# Patient Record
Sex: FEMALE | Race: WHITE | NOT HISPANIC OR LATINO | URBAN - METROPOLITAN AREA
[De-identification: names, ages, dates, MRNs, and addresses within clinical notes are randomized per-mention and may not be internally consistent; named-entity substitution may affect disease eponyms.]

---

## 2023-07-14 LAB
C TRACH DNA SPEC QL NAA+PROBE: NEGATIVE
HBV SURFACE AG SERPL QL IA: NEGATIVE
HCV AB SERPL QL IA: NEGATIVE
HIV 1+2 AB+HIV1 P24 AG SERPL QL IA: NEGATIVE
N GONORRHOEA DNA SPEC QL NAA+PROBE: NEGATIVE
RUBELLA IMMUNE STATUS: NORMAL

## 2024-01-04 LAB — PRENATAL STREP B CULTURE: POSITIVE

## 2024-01-23 ENCOUNTER — HOSPITAL ENCOUNTER (INPATIENT)
Facility: HOSPITAL | Age: 24
LOS: 2 days | Discharge: HOME | End: 2024-01-25
Attending: OBSTETRICS & GYNECOLOGY | Admitting: OBSTETRICS & GYNECOLOGY
Payer: MEDICAID

## 2024-01-23 DIAGNOSIS — Z34.90 ENCOUNTER FOR ELECTIVE INDUCTION OF LABOR: ICD-10-CM

## 2024-01-23 DIAGNOSIS — Z34.90 PREGNANCY: ICD-10-CM

## 2024-01-23 LAB
BASOPHILS # BLD AUTO: 0.02 X10(3)/MCL
BASOPHILS NFR BLD AUTO: 0.2 %
EOSINOPHIL # BLD AUTO: 0.03 X10(3)/MCL (ref 0–0.9)
EOSINOPHIL NFR BLD AUTO: 0.3 %
ERYTHROCYTE [DISTWIDTH] IN BLOOD BY AUTOMATED COUNT: 14.6 % (ref 11.5–17)
GROUP & RH: NORMAL
HCT VFR BLD AUTO: 35.4 % (ref 37–47)
HGB BLD-MCNC: 10.9 G/DL (ref 12–16)
IMM GRANULOCYTES # BLD AUTO: 0.03 X10(3)/MCL (ref 0–0.04)
IMM GRANULOCYTES NFR BLD AUTO: 0.3 %
INDIRECT COOMBS: NORMAL
LYMPHOCYTES # BLD AUTO: 2.19 X10(3)/MCL (ref 0.6–4.6)
LYMPHOCYTES NFR BLD AUTO: 23.8 %
MCH RBC QN AUTO: 25.3 PG (ref 27–31)
MCHC RBC AUTO-ENTMCNC: 30.8 G/DL (ref 33–36)
MCV RBC AUTO: 82.1 FL (ref 80–94)
MONOCYTES # BLD AUTO: 0.56 X10(3)/MCL (ref 0.1–1.3)
MONOCYTES NFR BLD AUTO: 6.1 %
NEUTROPHILS # BLD AUTO: 6.37 X10(3)/MCL (ref 2.1–9.2)
NEUTROPHILS NFR BLD AUTO: 69.3 %
NRBC BLD AUTO-RTO: 0 %
PLATELET # BLD AUTO: 166 X10(3)/MCL (ref 130–400)
PMV BLD AUTO: 12 FL (ref 7.4–10.4)
RBC # BLD AUTO: 4.31 X10(6)/MCL (ref 4.2–5.4)
SPECIMEN OUTDATE: NORMAL
T PALLIDUM AB SER QL: NONREACTIVE
WBC # SPEC AUTO: 9.2 X10(3)/MCL (ref 4.5–11.5)

## 2024-01-23 PROCEDURE — 25000003 PHARM REV CODE 250: Performed by: OBSTETRICS & GYNECOLOGY

## 2024-01-23 PROCEDURE — 63600175 PHARM REV CODE 636 W HCPCS: Performed by: OBSTETRICS & GYNECOLOGY

## 2024-01-23 PROCEDURE — 11000001 HC ACUTE MED/SURG PRIVATE ROOM

## 2024-01-23 PROCEDURE — 86901 BLOOD TYPING SEROLOGIC RH(D): CPT | Performed by: OBSTETRICS & GYNECOLOGY

## 2024-01-23 PROCEDURE — 86780 TREPONEMA PALLIDUM: CPT | Performed by: OBSTETRICS & GYNECOLOGY

## 2024-01-23 PROCEDURE — 85025 COMPLETE CBC W/AUTO DIFF WBC: CPT | Performed by: OBSTETRICS & GYNECOLOGY

## 2024-01-23 RX ORDER — OXYTOCIN/RINGER'S LACTATE 30/500 ML
334 PLASTIC BAG, INJECTION (ML) INTRAVENOUS ONCE
Status: DISCONTINUED | OUTPATIENT
Start: 2024-01-23 | End: 2024-01-24

## 2024-01-23 RX ORDER — OXYTOCIN/RINGER'S LACTATE 30/500 ML
95 PLASTIC BAG, INJECTION (ML) INTRAVENOUS ONCE AS NEEDED
Status: DISCONTINUED | OUTPATIENT
Start: 2024-01-23 | End: 2024-01-24

## 2024-01-23 RX ORDER — PENICILLIN G 3000000 [IU]/50ML
3 INJECTION, SOLUTION INTRAVENOUS
Status: DISCONTINUED | OUTPATIENT
Start: 2024-01-24 | End: 2024-01-24

## 2024-01-23 RX ORDER — CALCIUM CARBONATE 200(500)MG
500 TABLET,CHEWABLE ORAL 3 TIMES DAILY PRN
Status: DISCONTINUED | OUTPATIENT
Start: 2024-01-23 | End: 2024-01-24

## 2024-01-23 RX ORDER — OXYTOCIN 10 [USP'U]/ML
10 INJECTION, SOLUTION INTRAMUSCULAR; INTRAVENOUS ONCE AS NEEDED
Status: DISCONTINUED | OUTPATIENT
Start: 2024-01-23 | End: 2024-01-24

## 2024-01-23 RX ORDER — MISOPROSTOL 100 UG/1
800 TABLET ORAL ONCE AS NEEDED
Status: DISCONTINUED | OUTPATIENT
Start: 2024-01-23 | End: 2024-01-24

## 2024-01-23 RX ORDER — PROCHLORPERAZINE EDISYLATE 5 MG/ML
5 INJECTION INTRAMUSCULAR; INTRAVENOUS EVERY 6 HOURS PRN
Status: DISCONTINUED | OUTPATIENT
Start: 2024-01-23 | End: 2024-01-25 | Stop reason: HOSPADM

## 2024-01-23 RX ORDER — OXYTOCIN/RINGER'S LACTATE 30/500 ML
334 PLASTIC BAG, INJECTION (ML) INTRAVENOUS ONCE AS NEEDED
Status: DISCONTINUED | OUTPATIENT
Start: 2024-01-23 | End: 2024-01-24

## 2024-01-23 RX ORDER — ONDANSETRON 4 MG/1
8 TABLET, ORALLY DISINTEGRATING ORAL EVERY 8 HOURS PRN
Status: DISCONTINUED | OUTPATIENT
Start: 2024-01-23 | End: 2024-01-25 | Stop reason: HOSPADM

## 2024-01-23 RX ORDER — SIMETHICONE 80 MG
1 TABLET,CHEWABLE ORAL 4 TIMES DAILY PRN
Status: DISCONTINUED | OUTPATIENT
Start: 2024-01-23 | End: 2024-01-24

## 2024-01-23 RX ORDER — SODIUM CHLORIDE, SODIUM LACTATE, POTASSIUM CHLORIDE, CALCIUM CHLORIDE 600; 310; 30; 20 MG/100ML; MG/100ML; MG/100ML; MG/100ML
INJECTION, SOLUTION INTRAVENOUS CONTINUOUS
Status: DISCONTINUED | OUTPATIENT
Start: 2024-01-23 | End: 2024-01-24

## 2024-01-23 RX ORDER — OXYTOCIN/RINGER'S LACTATE 30/500 ML
0-30 PLASTIC BAG, INJECTION (ML) INTRAVENOUS CONTINUOUS
Status: DISCONTINUED | OUTPATIENT
Start: 2024-01-24 | End: 2024-01-24

## 2024-01-23 RX ORDER — DIPHENOXYLATE HYDROCHLORIDE AND ATROPINE SULFATE 2.5; .025 MG/1; MG/1
2 TABLET ORAL EVERY 6 HOURS PRN
Status: DISCONTINUED | OUTPATIENT
Start: 2024-01-23 | End: 2024-01-24

## 2024-01-23 RX ORDER — METHYLERGONOVINE MALEATE 0.2 MG/ML
200 INJECTION INTRAVENOUS ONCE AS NEEDED
Status: DISCONTINUED | OUTPATIENT
Start: 2024-01-23 | End: 2024-01-24

## 2024-01-23 RX ORDER — OXYTOCIN/RINGER'S LACTATE 30/500 ML
95 PLASTIC BAG, INJECTION (ML) INTRAVENOUS ONCE
Status: DISCONTINUED | OUTPATIENT
Start: 2024-01-23 | End: 2024-01-24

## 2024-01-23 RX ORDER — LIDOCAINE HYDROCHLORIDE 10 MG/ML
10 INJECTION INFILTRATION; PERINEURAL ONCE AS NEEDED
Status: DISCONTINUED | OUTPATIENT
Start: 2024-01-23 | End: 2024-01-24

## 2024-01-23 RX ORDER — CARBOPROST TROMETHAMINE 250 UG/ML
250 INJECTION, SOLUTION INTRAMUSCULAR
Status: DISCONTINUED | OUTPATIENT
Start: 2024-01-23 | End: 2024-01-24

## 2024-01-23 RX ADMIN — SODIUM CHLORIDE 5 MILLION UNITS: 900 INJECTION INTRAVENOUS at 09:01

## 2024-01-24 ENCOUNTER — ANESTHESIA (OUTPATIENT)
Dept: OBSTETRICS AND GYNECOLOGY | Facility: HOSPITAL | Age: 24
End: 2024-01-24
Payer: MEDICAID

## 2024-01-24 ENCOUNTER — ANESTHESIA EVENT (OUTPATIENT)
Dept: OBSTETRICS AND GYNECOLOGY | Facility: HOSPITAL | Age: 24
End: 2024-01-24
Payer: MEDICAID

## 2024-01-24 PROBLEM — Z34.90 ENCOUNTER FOR ELECTIVE INDUCTION OF LABOR: Status: ACTIVE | Noted: 2024-01-24

## 2024-01-24 PROCEDURE — 63600175 PHARM REV CODE 636 W HCPCS: Mod: JZ,JG | Performed by: NURSE ANESTHETIST, CERTIFIED REGISTERED

## 2024-01-24 PROCEDURE — 11000001 HC ACUTE MED/SURG PRIVATE ROOM

## 2024-01-24 PROCEDURE — 25000003 PHARM REV CODE 250: Performed by: ANESTHESIOLOGY

## 2024-01-24 PROCEDURE — 25000003 PHARM REV CODE 250: Performed by: NURSE ANESTHETIST, CERTIFIED REGISTERED

## 2024-01-24 PROCEDURE — 25000003 PHARM REV CODE 250: Performed by: OBSTETRICS & GYNECOLOGY

## 2024-01-24 PROCEDURE — 72200005 HC VAGINAL DELIVERY LEVEL II

## 2024-01-24 PROCEDURE — 63600175 PHARM REV CODE 636 W HCPCS: Performed by: OBSTETRICS & GYNECOLOGY

## 2024-01-24 PROCEDURE — 10907ZC DRAINAGE OF AMNIOTIC FLUID, THERAPEUTIC FROM PRODUCTS OF CONCEPTION, VIA NATURAL OR ARTIFICIAL OPENING: ICD-10-PCS | Performed by: OBSTETRICS & GYNECOLOGY

## 2024-01-24 PROCEDURE — 94761 N-INVAS EAR/PLS OXIMETRY MLT: CPT

## 2024-01-24 PROCEDURE — 72100002 HC LABOR CARE, 1ST 8 HOURS

## 2024-01-24 PROCEDURE — 59409 OBSTETRICAL CARE: CPT | Mod: QZ,,, | Performed by: NURSE ANESTHETIST, CERTIFIED REGISTERED

## 2024-01-24 PROCEDURE — 62326 NJX INTERLAMINAR LMBR/SAC: CPT | Performed by: NURSE ANESTHETIST, CERTIFIED REGISTERED

## 2024-01-24 PROCEDURE — 72100003 HC LABOR CARE, EA. ADDL. 8 HRS

## 2024-01-24 PROCEDURE — 51702 INSERT TEMP BLADDER CATH: CPT

## 2024-01-24 PROCEDURE — 3E033VJ INTRODUCTION OF OTHER HORMONE INTO PERIPHERAL VEIN, PERCUTANEOUS APPROACH: ICD-10-PCS | Performed by: OBSTETRICS & GYNECOLOGY

## 2024-01-24 RX ORDER — EPHEDRINE SULFATE 50 MG/ML
10 INJECTION, SOLUTION INTRAVENOUS ONCE
Status: COMPLETED | OUTPATIENT
Start: 2024-01-24 | End: 2024-01-24

## 2024-01-24 RX ORDER — DIPHENHYDRAMINE HCL 25 MG
25 CAPSULE ORAL EVERY 4 HOURS PRN
Status: DISCONTINUED | OUTPATIENT
Start: 2024-01-24 | End: 2024-01-25 | Stop reason: HOSPADM

## 2024-01-24 RX ORDER — OXYCODONE AND ACETAMINOPHEN 10; 325 MG/1; MG/1
1 TABLET ORAL EVERY 6 HOURS PRN
Status: DISCONTINUED | OUTPATIENT
Start: 2024-01-24 | End: 2024-01-25 | Stop reason: HOSPADM

## 2024-01-24 RX ORDER — OXYCODONE AND ACETAMINOPHEN 5; 325 MG/1; MG/1
1 TABLET ORAL EVERY 6 HOURS PRN
Status: DISCONTINUED | OUTPATIENT
Start: 2024-01-24 | End: 2024-01-25 | Stop reason: HOSPADM

## 2024-01-24 RX ORDER — FAMOTIDINE 10 MG/ML
20 INJECTION INTRAVENOUS ONCE
Status: DISCONTINUED | OUTPATIENT
Start: 2024-01-24 | End: 2024-01-25 | Stop reason: HOSPADM

## 2024-01-24 RX ORDER — BUPIVACAINE HYDROCHLORIDE 2.5 MG/ML
INJECTION, SOLUTION EPIDURAL; INFILTRATION; INTRACAUDAL
Status: DISCONTINUED | OUTPATIENT
Start: 2024-01-24 | End: 2024-01-24

## 2024-01-24 RX ORDER — OXYTOCIN 10 [USP'U]/ML
10 INJECTION, SOLUTION INTRAMUSCULAR; INTRAVENOUS ONCE AS NEEDED
Status: DISCONTINUED | OUTPATIENT
Start: 2024-01-24 | End: 2024-01-25 | Stop reason: HOSPADM

## 2024-01-24 RX ORDER — DOCUSATE SODIUM 100 MG/1
200 CAPSULE, LIQUID FILLED ORAL 2 TIMES DAILY PRN
Status: DISCONTINUED | OUTPATIENT
Start: 2024-01-24 | End: 2024-01-25 | Stop reason: HOSPADM

## 2024-01-24 RX ORDER — OXYTOCIN/RINGER'S LACTATE 30/500 ML
95 PLASTIC BAG, INJECTION (ML) INTRAVENOUS ONCE AS NEEDED
Status: DISCONTINUED | OUTPATIENT
Start: 2024-01-24 | End: 2024-01-25 | Stop reason: HOSPADM

## 2024-01-24 RX ORDER — EPHEDRINE SULFATE 50 MG/ML
10 INJECTION, SOLUTION INTRAVENOUS ONCE AS NEEDED
Status: COMPLETED | OUTPATIENT
Start: 2024-01-24 | End: 2024-01-24

## 2024-01-24 RX ORDER — METHYLERGONOVINE MALEATE 0.2 MG/ML
200 INJECTION INTRAVENOUS ONCE AS NEEDED
Status: DISCONTINUED | OUTPATIENT
Start: 2024-01-24 | End: 2024-01-25 | Stop reason: HOSPADM

## 2024-01-24 RX ORDER — ONDANSETRON 4 MG/1
4 TABLET, ORALLY DISINTEGRATING ORAL EVERY 6 HOURS PRN
Status: DISCONTINUED | OUTPATIENT
Start: 2024-01-24 | End: 2024-01-25 | Stop reason: HOSPADM

## 2024-01-24 RX ORDER — MISOPROSTOL 100 UG/1
800 TABLET ORAL ONCE AS NEEDED
Status: DISCONTINUED | OUTPATIENT
Start: 2024-01-24 | End: 2024-01-25 | Stop reason: HOSPADM

## 2024-01-24 RX ORDER — OXYTOCIN/RINGER'S LACTATE 30/500 ML
0-30 PLASTIC BAG, INJECTION (ML) INTRAVENOUS CONTINUOUS
Status: DISCONTINUED | OUTPATIENT
Start: 2024-01-24 | End: 2024-01-24

## 2024-01-24 RX ORDER — DIPHENHYDRAMINE HYDROCHLORIDE 50 MG/ML
12.5 INJECTION INTRAMUSCULAR; INTRAVENOUS EVERY 4 HOURS PRN
Status: DISCONTINUED | OUTPATIENT
Start: 2024-01-24 | End: 2024-01-25 | Stop reason: HOSPADM

## 2024-01-24 RX ORDER — DIPHENHYDRAMINE HYDROCHLORIDE 50 MG/ML
25 INJECTION INTRAMUSCULAR; INTRAVENOUS EVERY 4 HOURS PRN
Status: DISCONTINUED | OUTPATIENT
Start: 2024-01-24 | End: 2024-01-25 | Stop reason: HOSPADM

## 2024-01-24 RX ORDER — SIMETHICONE 80 MG
1 TABLET,CHEWABLE ORAL EVERY 4 HOURS PRN
Status: DISCONTINUED | OUTPATIENT
Start: 2024-01-24 | End: 2024-01-25 | Stop reason: HOSPADM

## 2024-01-24 RX ORDER — ACETAMINOPHEN 325 MG/1
325 TABLET ORAL EVERY 4 HOURS PRN
Status: DISCONTINUED | OUTPATIENT
Start: 2024-01-24 | End: 2024-01-25 | Stop reason: HOSPADM

## 2024-01-24 RX ORDER — FENTANYL/BUPIVACAINE/NS/PF 2-1250MCG
PLASTIC BAG, INJECTION (ML) INJECTION CONTINUOUS PRN
Status: DISCONTINUED | OUTPATIENT
Start: 2024-01-24 | End: 2024-01-24

## 2024-01-24 RX ORDER — FENTANYL/BUPIVACAINE/NS/PF 2-1250MCG
PLASTIC BAG, INJECTION (ML) INJECTION CONTINUOUS
Status: DISCONTINUED | OUTPATIENT
Start: 2024-01-24 | End: 2024-01-25 | Stop reason: HOSPADM

## 2024-01-24 RX ORDER — HYDROCORTISONE 25 MG/G
CREAM TOPICAL 3 TIMES DAILY PRN
Status: DISCONTINUED | OUTPATIENT
Start: 2024-01-24 | End: 2024-01-25 | Stop reason: HOSPADM

## 2024-01-24 RX ORDER — CARBOPROST TROMETHAMINE 250 UG/ML
250 INJECTION, SOLUTION INTRAMUSCULAR
Status: DISCONTINUED | OUTPATIENT
Start: 2024-01-24 | End: 2024-01-25 | Stop reason: HOSPADM

## 2024-01-24 RX ORDER — IBUPROFEN 600 MG/1
600 TABLET ORAL EVERY 6 HOURS
Status: DISCONTINUED | OUTPATIENT
Start: 2024-01-24 | End: 2024-01-24

## 2024-01-24 RX ORDER — ONDANSETRON HYDROCHLORIDE 2 MG/ML
4 INJECTION, SOLUTION INTRAVENOUS ONCE
Status: DISCONTINUED | OUTPATIENT
Start: 2024-01-24 | End: 2024-01-24

## 2024-01-24 RX ORDER — IBUPROFEN 600 MG/1
600 TABLET ORAL EVERY 6 HOURS
Status: DISCONTINUED | OUTPATIENT
Start: 2024-01-24 | End: 2024-01-25 | Stop reason: HOSPADM

## 2024-01-24 RX ORDER — OXYTOCIN/RINGER'S LACTATE 30/500 ML
95 PLASTIC BAG, INJECTION (ML) INTRAVENOUS ONCE
Status: DISCONTINUED | OUTPATIENT
Start: 2024-01-24 | End: 2024-01-25 | Stop reason: HOSPADM

## 2024-01-24 RX ORDER — DIPHENOXYLATE HYDROCHLORIDE AND ATROPINE SULFATE 2.5; .025 MG/1; MG/1
2 TABLET ORAL EVERY 6 HOURS PRN
Status: DISCONTINUED | OUTPATIENT
Start: 2024-01-24 | End: 2024-01-25 | Stop reason: HOSPADM

## 2024-01-24 RX ORDER — OXYTOCIN/RINGER'S LACTATE 30/500 ML
334 PLASTIC BAG, INJECTION (ML) INTRAVENOUS ONCE AS NEEDED
Status: DISCONTINUED | OUTPATIENT
Start: 2024-01-24 | End: 2024-01-25 | Stop reason: HOSPADM

## 2024-01-24 RX ADMIN — EPHEDRINE SULFATE 10 MG: 50 INJECTION INTRAVENOUS at 04:01

## 2024-01-24 RX ADMIN — SODIUM CHLORIDE, POTASSIUM CHLORIDE, SODIUM LACTATE AND CALCIUM CHLORIDE: 600; 310; 30; 20 INJECTION, SOLUTION INTRAVENOUS at 07:01

## 2024-01-24 RX ADMIN — ONDANSETRON 4 MG: 4 TABLET, ORALLY DISINTEGRATING ORAL at 07:01

## 2024-01-24 RX ADMIN — PENICILLIN G 3 MILLION UNITS: 3000000 INJECTION, SOLUTION INTRAVENOUS at 08:01

## 2024-01-24 RX ADMIN — PENICILLIN G 3 MILLION UNITS: 3000000 INJECTION, SOLUTION INTRAVENOUS at 12:01

## 2024-01-24 RX ADMIN — EPHEDRINE SULFATE 10 MG: 50 INJECTION INTRAVENOUS at 03:01

## 2024-01-24 RX ADMIN — DOCUSATE SODIUM 200 MG: 100 CAPSULE, LIQUID FILLED ORAL at 09:01

## 2024-01-24 RX ADMIN — OXYTOCIN 2 MILLI-UNITS/MIN: 10 INJECTION, SOLUTION INTRAMUSCULAR; INTRAVENOUS at 04:01

## 2024-01-24 RX ADMIN — IBUPROFEN 600 MG: 600 TABLET, FILM COATED ORAL at 04:01

## 2024-01-24 RX ADMIN — PENICILLIN G 3 MILLION UNITS: 3000000 INJECTION, SOLUTION INTRAVENOUS at 04:01

## 2024-01-24 RX ADMIN — BUPIVACAINE HYDROCHLORIDE 8 ML: 2.5 INJECTION, SOLUTION EPIDURAL; INFILTRATION; INTRACAUDAL; PERINEURAL at 02:01

## 2024-01-24 RX ADMIN — Medication 12 ML/HR: at 02:01

## 2024-01-24 RX ADMIN — IBUPROFEN 600 MG: 600 TABLET, FILM COATED ORAL at 09:01

## 2024-01-24 RX ADMIN — Medication: at 04:01

## 2024-01-24 NOTE — ANESTHESIA PROCEDURE NOTES
Epidural    Patient location during procedure: OB   Reason for block: primary anesthetic   Reason for block: labor analgesia requested by patient and obstetrician  Diagnosis: Labor Pain   Start time: 1/24/2024 2:48 AM  Timeout: 1/24/2024 2:47 AM  End time: 1/24/2024 2:57 AM  Surgery related to: Active Labor    Staffing  Performing Provider: Musa Lynn CRNA  Authorizing Provider: Kelton Miranda MD    Staffing  Performed by: Musa Lynn CRNA  Authorized by: Kelton Miranda MD        Preanesthetic Checklist  Completed: patient identified, IV checked, site marked, risks and benefits discussed, surgical consent, monitors and equipment checked, pre-op evaluation, timeout performed, anesthesia consent given, hand hygiene performed and patient being monitored  Preparation  Patient position: sitting  Prep: ChloraPrep  Patient monitoring: Pulse Ox and Blood Pressure  Reason for block: primary anesthetic   Epidural  Skin Anesthetic: lidocaine 1%  Skin Wheal: 3 mL  Administration type: continuous  Approach: midline  Interspace: L3-4    Injection technique: BUNNY saline  Needle and Epidural Catheter  Needle type: Tuohy   Needle gauge: 17  Needle length: 7.0 inches  Needle insertion depth: 5 cm  Catheter type: multi-orifice  Catheter size: 18 G  Catheter at skin depth: 12 cm  Insertion Attempts: 1  Test dose: 3 mL of lidocaine 1.5% with Epi 1-to-200,000  Additional Documentation: incremental injection, no paresthesia on injection, no significant pain on injection, negative aspiration for heme and CSF and no significant complaints from patient  Needle localization: anatomical landmarks  Assessment  Upper dermatomal levels - Left: T10  Right: T10   Dermatomal levels determined by alcohol wipe  Ease of block: easy  Patient's tolerance of the procedure: comfortable throughout block and no complaints  Additional Notes  States comfortable No inadvertent dural puncture with Tuohy.  Dural puncture not performed with  spinal needle

## 2024-01-24 NOTE — L&D DELIVERY NOTE
[facility] - Labor and Delivery  Vaginal Delivery   Operative Note    SUMMARY     Normal spontaneous vaginal delivery of live infant, was placed on mothers abdomen for skin to skin and bulb suctioning performed.  Infant delivered position OA over intact perineum.  Nuchal cord: No.    Spontaneous delivery of placenta and IV pitocin given noting good uterine tone.  No lacerations noted.  Patient tolerated delivery well. Sponge needle and lap counted correctly x2.    Indications: Encounter for elective induction of labor  Pregnancy complicated by:   Patient Active Problem List   Diagnosis    Encounter for elective induction of labor     Admitting GA: 39w4d    Delivery Information for Chacho Moncada    Birth information:  YOB: 2026   Time of birth: 2:27 PM   Sex: male   Head Delivery Date/Time: 2024  2:27 PM   Delivery type: Vaginal, Spontaneous   Gestational Age: 39w4d        Delivery Providers    Delivering clinician: Khadijah Chino MD   Provider Role    Jackie Carrasquillo, RN Registered Nurse    Deinse Hurd RN Registered Nurse    Brayan Avila RN Registered Nurse    Catherine Bah LPN Licensed Practical Nurse              Measurements    Weight:   Length:          Apgars    Living status:   Apgar Component Scores:  1 min.:  5 min.:  10 min.:  15 min.:  20 min.:    Skin color:         Heart rate:         Reflex irritability:         Muscle tone:         Respiratory effort:         Total:                  Operative Delivery    Forceps attempted?: No  Vacuum extractor attempted?: No         Shoulder Dystocia    Shoulder dystocia present?: No           Presentation    Presentation: Vertex  Position: Middle Occiput Anterior           Interventions/Resuscitation           Cord    Vessels: 3 vessels  Complications: None  Delayed Cord Clamping?: Yes  Cord Clamped Date/Time: 2024  2:28 PM  Cord Blood Disposition: Sent with Baby  Gases Sent?: No  Stem Cell Collection  (by MD): No       Placenta    Placenta delivery date/time: 2024 1429  Placenta removal: Spontaneous  Placenta appearance: Intact  Placenta disposition: Discarded           Labor Events:       labor: No     Labor Onset Date/Time: 2024 05:00     Dilation Complete Date/Time: 2024 14:10     Start Pushing Date/Time: 2024 14:15     Rupture Date/Time: 24  1132         Rupture type: ARM (Artificial Rupture)         Fluid Amount:       Fluid Color: Clear       steroids: None     Antibiotics given for GBS: Yes     Induction: oxytocin;amniotomy     Indications for induction:  Elective     Augmentation:       Indications for augmentation:       Labor complications: None     Additional complications:          Cervical ripening:                     Delivery:      Episiotomy: None     Indication for Episiotomy:       Perineal Lacerations: None Repaired:      Periurethral Laceration:   Repaired:     Labial Laceration:   Repaired:     Sulcus Laceration:   Repaired:     Vaginal Laceration:   Repaired:     Cervical Laceration:   Repaired:     Repair suture: None     Repair # of packets:       Last Value - EBL - Nursing (mL):       Sum - EBL - Nursing (mL): 0     Last Value - EBL - Anesthesia (mL):      Calculated QBL (mL): 350      Vaginal Sweep Performed: Yes     Surgicount Correct: Yes       Other providers:       Anesthesia    Method: Epidural          Details (if applicable):  Trial of Labor      Categorization:      Priority:     Indications for :     Incision Type:       Additional  information:  Forceps:    Vacuum:    Breech:    Observed anomalies    Other (Comments):

## 2024-01-24 NOTE — H&P
"   HISTORY AND PHYSICAL                                                OBSTETRICS          Subjective:      Lana Moncada is a 23 y.o.  female with IUP at 39w4d weeks gestation who presents to L&D for induction of labor. Pertinent medical history for this pregnancy includes none.  Care this pregnancy has been with Dr. Kayla TOBAR    PMHx: History reviewed. No pertinent past medical history.    PSHx: History reviewed. No pertinent surgical history.    All: Review of patient's allergies indicates:  No Known Allergies    Meds:   No medications prior to admission.       SH:   Social History     Socioeconomic History    Marital status: Single   Tobacco Use    Smoking status: Never    Smokeless tobacco: Never       FH: History reviewed. No pertinent family history.    OBHx:   OB History    Para Term  AB Living   3 1 1 0 1 1   SAB IAB Ectopic Multiple Live Births   1 0 0 0 0      # Outcome Date GA Lbr Emiliano/2nd Weight Sex Delivery Anes PTL Lv   3 Current            2 Term            1 SAB                Objective:      /61   Pulse 89   Temp 98.7 °F (37.1 °C) (Axillary)   Resp 18   Ht 5' 7" (1.702 m)   Wt 83.9 kg (185 lb)   SpO2 100%   Breastfeeding No   BMI 28.98 kg/m²   Body mass index is 28.98 kg/m².    General:   alert and cooperative   HEENT:  normocephalic, atraumatic   Lungs:   clear to auscultation bilaterally   Heart:   regular rate and rhythm, S1, S2 normal   Abdomen:  gravid, non-tender   Extremities non-tender, no edema   Derm: no rashes or lesions   Psych: appropriate mood and affect   Pelvis:  adequate       FHT: Category: 1                 TOCO: Contractions: irregular, every 3-5 minutes       Cervix:     Dilation: 4 cm    Effacement: 50%    Station:  -2    Consistency: moderate    Position mid       Lab Review  GBS: negative      Assessment:     23 y.o.  at 39w4d weeks gestation.  Admit for iol    Active Hospital Problems    Diagnosis  POA    *Encounter for " elective induction of labor [Z34.90]  Not Applicable      Resolved Hospital Problems   No resolved problems to display.          Plan:     1. Risks, benefits, alternatives and possible complications have been discussed in detail with the patient. All questions have been answered, and Ms. Moncada has voiced understanding and agrees to the treatment plan.  2. Consents signed and in chart  3. Admit to Labor and Delivery unit

## 2024-01-24 NOTE — ANESTHESIA POSTPROCEDURE EVALUATION
Anesthesia Post Evaluation    Patient: Lana Moncada    Procedure(s) Performed: * No procedures listed *    Final Anesthesia Type: epidural      Patient location during evaluation: labor & delivery  Patient participation: Yes- Able to Participate  Post-procedure vital signs: reviewed and stable (No complaints at this time)  Pain management: adequate      Anesthetic complications: no                No complaints at this time.      Vitals Value Taken Time   /69 01/24/24 1515   Temp 36.9 °C (98.5 °F) 01/24/24 1444   Pulse 94 01/24/24 1515   Resp 18 01/24/24 1444   SpO2 97 % 01/24/24 1434   Vitals shown include unvalidated device data.      No case tracking events are documented in the log.      Pain/Katja Score: No data recorded

## 2024-01-24 NOTE — ANESTHESIA PREPROCEDURE EVALUATION
01/24/2024  Lana Moncada is a 23 y.o., female.      Pre-op Assessment    I have reviewed the Patient Summary Reports.     I have reviewed the Nursing Notes. I have reviewed the NPO Status.   I have reviewed the Medications.     Review of Systems      Physical Exam  General: Well nourished, Cooperative, Alert and Oriented    Airway:  Mallampati: II   Mouth Opening: Normal  TM Distance: Normal  Tongue: Normal  Neck ROM: Normal ROM    Dental:  Intact        Anesthesia Plan  Type of Anesthesia, risks & benefits discussed:    Anesthesia Type: Epidural  Intra-op Monitoring Plan: Standard ASA Monitors  Post Op Pain Control Plan: multimodal analgesia, epidural analgesia and PCA  Informed Consent: Informed consent signed with the Patient and all parties understand the risks and agree with anesthesia plan.  All questions answered.   ASA Score: 2    Ready For Surgery From Anesthesia Perspective.     .

## 2024-01-25 ENCOUNTER — ANESTHESIA (OUTPATIENT)
Dept: OBSTETRICS AND GYNECOLOGY | Facility: HOSPITAL | Age: 24
End: 2024-01-25
Payer: MEDICAID

## 2024-01-25 ENCOUNTER — ANESTHESIA EVENT (OUTPATIENT)
Dept: OBSTETRICS AND GYNECOLOGY | Facility: HOSPITAL | Age: 24
End: 2024-01-25
Payer: MEDICAID

## 2024-01-25 ENCOUNTER — LACTATION ENCOUNTER (OUTPATIENT)
Dept: OBSTETRICS AND GYNECOLOGY | Facility: HOSPITAL | Age: 24
End: 2024-01-25

## 2024-01-25 VITALS
SYSTOLIC BLOOD PRESSURE: 111 MMHG | OXYGEN SATURATION: 97 % | BODY MASS INDEX: 29.03 KG/M2 | WEIGHT: 185 LBS | HEART RATE: 88 BPM | HEIGHT: 67 IN | DIASTOLIC BLOOD PRESSURE: 74 MMHG | TEMPERATURE: 99 F | RESPIRATION RATE: 18 BRPM

## 2024-01-25 PROCEDURE — 25000003 PHARM REV CODE 250: Performed by: OBSTETRICS & GYNECOLOGY

## 2024-01-25 RX ADMIN — IBUPROFEN 600 MG: 600 TABLET, FILM COATED ORAL at 03:01

## 2024-01-25 RX ADMIN — DOCUSATE SODIUM 200 MG: 100 CAPSULE, LIQUID FILLED ORAL at 09:01

## 2024-01-25 RX ADMIN — IBUPROFEN 600 MG: 600 TABLET, FILM COATED ORAL at 04:01

## 2024-01-25 RX ADMIN — IBUPROFEN 600 MG: 600 TABLET, FILM COATED ORAL at 09:01

## 2024-01-25 NOTE — ANESTHESIA POSTPROCEDURE EVALUATION
Anesthesia Post Evaluation    Patient: Lana Moncada    Procedure(s) Performed: * No surgery found *    Final Anesthesia Type: epidural      Patient location during evaluation: floor  Patient participation: Yes- Able to Participate  Level of consciousness: awake and alert and oriented  Post-procedure vital signs: reviewed and stable  Pain management: adequate  Airway patency: patent    PONV status at discharge: No PONV  Anesthetic complications: no      Cardiovascular status: blood pressure returned to baseline  Respiratory status: unassisted, spontaneous ventilation and room air  Hydration status: euvolemic  Follow-up not needed.  Comments: Denies headache, mod-severe backpain, or lower extremity motor weekness              Vitals Value Taken Time   /74 01/25/24 0819   Temp 37.1 °C (98.7 °F) 01/25/24 0819   Pulse 88 01/25/24 0819   Resp 18 01/24/24 1533   SpO2 97 % 01/25/24 0819         Cannot find surgical log.      Pain/Katja Score: Pain Rating Prior to Med Admin: 4 (1/25/2024  9:58 AM)

## 2024-01-25 NOTE — DISCHARGE SUMMARY
"Delivery Discharge Summary  Obstetrics      Primary OB Clinician: Salome Funk MD    Discharge Provider: KISHORE CUMMINGS MD    Admission date: 2024  Discharge date: 2024    Admit Dx:   Discharge Dx:    Patient Active Problem List   Diagnosis    Encounter for elective induction of labor     (normal spontaneous vaginal delivery)       Procedure:     Hospital Course:  Lana Moncada is a 23 y.o. now  who was admitted on 2024 for delivery. Patient delivered a viable . Please see delivery note for further details. Pt was in stable condition post delivery and was transferred to the Mother-Baby Unit. Her postpartum course was uncomplicated. On the date of discharge, patient's pain is controlled with oral pain medications. She is tolerating ambulation without SOB or CP, and PO diet without N/V. Reported lochia is within the normal range. Pt in stable condition and ready for discharge.     Pertinent studies:  Postpartum CBC  Lab Results   Component Value Date    WBC 9.20 2024    HGB 10.9 (L) 2024    HCT 35.4 (L) 2024    MCV 82.1 2024     2024       Delivery:    Episiotomy: None   Lacerations: None   Repair suture: None   Repair # of packets:     Blood loss (ml):       Birth information:  YOB: 2026   Time of birth: 2:27 PM   Sex: male   Delivery type: Vaginal, Spontaneous   Gestational Age: 39w4d     Measurements    Weight: 3629 g  Weight (lbs): 8 lb  Length: 53.3 cm  Length (in): 21"  Head circumference: 33 cm         Delivery Clinician: Delivery Providers    Delivering clinician: Khadijah Chino MD   Provider Role    Jackie Carrasquillo, RN Registered Nurse    Denise Hurd RN Registered Nurse    Brayan Avila RN Registered Nurse    Catherine Bah LPN Licensed Practical Nurse             Additional  information:  Forceps:    Vacuum:    Breech:    Observed anomalies      Living?:     Apgars    Living status: " Living  Apgar Component Scores:  1 min.:  5 min.:  10 min.:  15 min.:  20 min.:    Skin color:  0  1       Heart rate:  2  2       Reflex irritability:  2  2       Muscle tone:  2  2       Respiratory effort:  2  2       Total:  8  9       Apgars assigned by: DUNCAN WOODS LPN         Placenta: Delivered:       appearance    Disposition: To home, self care    Follow Up: 6 weeks    Patient Instructions:   1. Call the office for any bleeding >2 pads/hour for >2 hours, temperature >100.4, pain that is uncontrolled with medications, or for any other concerns.  2. Pelvic rest and no tub baths x 6 weeks.  3. No driving while on narcotics.    There are no discharge medications for this patient.      KISHORE CUMMINGS

## 2024-01-25 NOTE — PLAN OF CARE
Problem: Breastfeeding  Goal: Effective Breastfeeding  Intervention: Promote Effective Breastfeeding  Flowsheets (Taken 1/25/2024 0430)  Breastfeeding Assistance: support offered  Parent/Child Attachment Promotion:   positive reinforcement provided   face-to-face positioning promoted   strengths emphasized  Intervention: Support Exclusive Breastfeeding Success  Flowsheets (Taken 1/25/2024 0430)  Supportive Measures:   active listening utilized   counseling provided   problem-solving facilitated   verbalization of feelings encouraged  Breastfeeding Support:   encouragement provided   lactation counseling provided

## 2024-01-25 NOTE — PROGRESS NOTES
PostPartum Progress Note        Subjective:      Post-Partum Day #1 after uncomplicated vaginal delivery.    Patient is without complaints. Lochia decreasing. Breast feeding. Pain is well controlled. Patient is ambulating. Tolerating Full Regular diet.Overall mother and baby are doing well.     Objective:      Temp:  [97.9 °F (36.6 °C)-98.7 °F (37.1 °C)] 98.7 °F (37.1 °C)  Pulse:  [] 88  Resp:  [16-18] 18  SpO2:  [97 %-100 %] 97 %  BP: ()/(53-74) 111/74    Intake/Output Summary (Last 24 hours) at 2024 0917  Last data filed at 2024 1432  Gross per 24 hour   Intake --   Output 2650 ml   Net -2650 ml     Body mass index is 28.98 kg/m².    General: no acute distress  Abdomen: soft, non-tender, non-distended; Fundus firm and below the umbilicus     Extremities: non-tender, symmetric, trace edema    Group & Rh   Date Value Ref Range Status   2024 A NEG  Final     Recent Results (from the past 336 hour(s))   CBC with Differential    Collection Time: 24  8:17 PM   Result Value Ref Range    WBC 9.20 4.50 - 11.50 x10(3)/mcL    Hgb 10.9 (L) 12.0 - 16.0 g/dL    Hct 35.4 (L) 37.0 - 47.0 %    Platelet 166 130 - 400 x10(3)/mcL          Assessment:     23 y.o.  S/P  Post-Partum Day #1  - Doing Well      Plan:     1. Continue routine postpartum care  2. Plan for D/C today at 24 hours post delivery and once  labs are completed and confirmed appropriate.

## 2024-01-26 NOTE — LACTATION NOTE
"This note was copied from a baby's chart.  Baby sleepy, assisted with waking, position and latch. Good latch achieved with swallows noted. Mom verbalized comfort. Discharge instructions reviewed. Answered moms questions. Verbalized understanding of all.     [facility]        [phone]  Discharge Instructions    Watch for early feeding cues (rooting, hand to mouth, smacking lips, sticking out tongue). Offer the breast at the first signs of hunger. Crying is a late sign of hunger; don't wait until then.    Feed your baby at least 8-12 times in a 24-hour period. Feeding early and often will ensure a plentiful milk supply for you and your baby and will prevent engorgement in the coming days.  Do not limit or schedule feedings.    "Cluster feeding" is normal; baby may nurse very often for several times in a row. This commonly occurs in the evening or early part of the night.    Allow your baby to finish one side before offering the other. You can try to burp the baby and then offer the other breast if he/ she seems to still be hungry.     Skin to skin contact helps a sleepy baby want to nurse. Babies who are frequently held skin to skin nurse better and longer. Skin to skin increases mom's milk-making hormone levels as well. Skin to skin can help calm baby too.     By the end of the first week, you want to see 6-8 wet diapers per day and 3-5 yellow, seedy stools (stools will change from black to green to yellow by the end of the 1st week. Refer to chart in breastfeeding booklet to see how many wet/ dirty diapers baby should be having each day. Notify pediatrician if baby is not having enough wet and dirty diapers.    It is best to avoid bottles and pacifiers for the first 4 weeks while getting breastfeeding established.     Back to work or school: 4 weeks is a good time to start pumping after morning feeds in order to store milk for baby, although you may pump before if needed. Around 4-6 weeks is a good " time to introduce a bottle of pumped milk to baby if you will go back to work or school.     You should feel a tugging or pulling sensation when your baby nurses; it should never feel sharp, pinching, or singing. If there is pain, try to adjust the latch. Make sure your baby opens his mouth wide to latch on. His lips should be flanged out, like a fish. (You may want to refer to the handouts in your packet or view latch videos at [employer] or [employer].    Listen for swallowing. This indicates your baby is transferring that milk!     Your milk will increase between days 3-5. Frequent feeds can help with engorgement.     If your breasts begin to get engorged, place warm cloths on them or  a warm shower before feeding. This will help the milk begin to flow. Feed often to drain the breasts. After feeding, you may use cold packs for 10-15 minutes to reduce swelling. You may also want to pump for comfort; don't overdo it- just pump enough to relieve the fullness.     No soap or lotions to the nipples except for medical grade lanolin or nipple cream for soreness.     All babies go through growth spurts. The first one is generally around 2-3 weeks. If your baby starts to nurse a lot more than usual, this is likely the reason. Growth spurts happen every so often and usually last for 3-5 days.     Remember to check the safety of any medications, prescription or non-prescription (including herbals), before you take them. Your baby's pediatrician is the best one to confirm the safety of the medication while you are breastfeeding. You may also phone us. We can tell you about safety ratings that have been published regarding a particular medication. You may wish to phone the [facility] at [phone] to check the safety of a medication.     Call with any questions or concerns. Don't wait-- ask for help early. Breastfeeding Resources can be found on the last few pages of your Breastfeeding  Booklet given to you in the hospital.